# Patient Record
Sex: MALE
[De-identification: names, ages, dates, MRNs, and addresses within clinical notes are randomized per-mention and may not be internally consistent; named-entity substitution may affect disease eponyms.]

---

## 2017-12-13 ENCOUNTER — APPOINTMENT (OUTPATIENT)
Dept: DERMATOLOGY | Facility: CLINIC | Age: 82
End: 2017-12-13

## 2018-04-02 ENCOUNTER — APPOINTMENT (OUTPATIENT)
Dept: DERMATOLOGY | Facility: CLINIC | Age: 83
End: 2018-04-02
Payer: MEDICARE

## 2018-04-02 PROCEDURE — 99213 OFFICE O/P EST LOW 20 MIN: CPT

## 2021-01-04 ENCOUNTER — APPOINTMENT (OUTPATIENT)
Dept: DERMATOLOGY | Facility: CLINIC | Age: 86
End: 2021-01-04
Payer: MEDICARE

## 2021-01-04 PROCEDURE — 10060 I&D ABSCESS SIMPLE/SINGLE: CPT

## 2021-01-09 ENCOUNTER — EMERGENCY (EMERGENCY)
Facility: HOSPITAL | Age: 86
LOS: 1 days | Discharge: HOME | End: 2021-01-09
Attending: EMERGENCY MEDICINE | Admitting: STUDENT IN AN ORGANIZED HEALTH CARE EDUCATION/TRAINING PROGRAM
Payer: MEDICARE

## 2021-01-09 VITALS
TEMPERATURE: 98 F | SYSTOLIC BLOOD PRESSURE: 125 MMHG | OXYGEN SATURATION: 99 % | HEIGHT: 65 IN | WEIGHT: 164.91 LBS | HEART RATE: 82 BPM | DIASTOLIC BLOOD PRESSURE: 80 MMHG | RESPIRATION RATE: 16 BRPM

## 2021-01-09 LAB
ALBUMIN SERPL ELPH-MCNC: 3.1 G/DL — LOW (ref 3.3–5.2)
ALP SERPL-CCNC: 100 U/L — SIGNIFICANT CHANGE UP (ref 40–120)
ALT FLD-CCNC: 26 U/L — SIGNIFICANT CHANGE UP
ANION GAP SERPL CALC-SCNC: 9 MMOL/L — SIGNIFICANT CHANGE UP (ref 5–17)
APPEARANCE UR: CLEAR — SIGNIFICANT CHANGE UP
AST SERPL-CCNC: 32 U/L — SIGNIFICANT CHANGE UP
BASOPHILS # BLD AUTO: 0 K/UL — SIGNIFICANT CHANGE UP (ref 0–0.2)
BASOPHILS NFR BLD AUTO: 0 % — SIGNIFICANT CHANGE UP (ref 0–2)
BILIRUB SERPL-MCNC: 1.2 MG/DL — SIGNIFICANT CHANGE UP (ref 0.4–2)
BILIRUB UR-MCNC: NEGATIVE — SIGNIFICANT CHANGE UP
BUN SERPL-MCNC: 36 MG/DL — HIGH (ref 8–20)
CALCIUM SERPL-MCNC: 8.5 MG/DL — LOW (ref 8.6–10.2)
CHLORIDE SERPL-SCNC: 99 MMOL/L — SIGNIFICANT CHANGE UP (ref 98–107)
CO2 SERPL-SCNC: 28 MMOL/L — SIGNIFICANT CHANGE UP (ref 22–29)
COLOR SPEC: YELLOW — SIGNIFICANT CHANGE UP
CREAT SERPL-MCNC: 1.76 MG/DL — HIGH (ref 0.5–1.3)
DIFF PNL FLD: NEGATIVE — SIGNIFICANT CHANGE UP
EOSINOPHIL # BLD AUTO: 0.17 K/UL — SIGNIFICANT CHANGE UP (ref 0–0.5)
EOSINOPHIL NFR BLD AUTO: 1.7 % — SIGNIFICANT CHANGE UP (ref 0–6)
EPI CELLS # UR: SIGNIFICANT CHANGE UP
GLUCOSE SERPL-MCNC: 110 MG/DL — HIGH (ref 70–99)
GLUCOSE UR QL: NEGATIVE MG/DL — SIGNIFICANT CHANGE UP
HCT VFR BLD CALC: 27 % — LOW (ref 39–50)
HGB BLD-MCNC: 8.3 G/DL — LOW (ref 13–17)
KETONES UR-MCNC: NEGATIVE — SIGNIFICANT CHANGE UP
LEUKOCYTE ESTERASE UR-ACNC: ABNORMAL
LYMPHOCYTES # BLD AUTO: 0.42 K/UL — LOW (ref 1–3.3)
LYMPHOCYTES # BLD AUTO: 4.3 % — LOW (ref 13–44)
MCHC RBC-ENTMCNC: 22.6 PG — LOW (ref 27–34)
MCHC RBC-ENTMCNC: 30.7 GM/DL — LOW (ref 32–36)
MCV RBC AUTO: 73.6 FL — LOW (ref 80–100)
MONOCYTES # BLD AUTO: 0.17 K/UL — SIGNIFICANT CHANGE UP (ref 0–0.9)
MONOCYTES NFR BLD AUTO: 1.7 % — LOW (ref 2–14)
NEUTROPHILS # BLD AUTO: 9.09 K/UL — HIGH (ref 1.8–7.4)
NEUTROPHILS NFR BLD AUTO: 92.3 % — HIGH (ref 43–77)
NITRITE UR-MCNC: NEGATIVE — SIGNIFICANT CHANGE UP
PH UR: 6 — SIGNIFICANT CHANGE UP (ref 5–8)
PLATELET # BLD AUTO: 203 K/UL — SIGNIFICANT CHANGE UP (ref 150–400)
POTASSIUM SERPL-MCNC: 4.4 MMOL/L — SIGNIFICANT CHANGE UP (ref 3.5–5.3)
POTASSIUM SERPL-SCNC: 4.4 MMOL/L — SIGNIFICANT CHANGE UP (ref 3.5–5.3)
PROT SERPL-MCNC: 7.1 G/DL — SIGNIFICANT CHANGE UP (ref 6.6–8.7)
PROT UR-MCNC: 15 MG/DL
RBC # BLD: 3.67 M/UL — LOW (ref 4.2–5.8)
RBC # FLD: 19.2 % — HIGH (ref 10.3–14.5)
RBC CASTS # UR COMP ASSIST: NEGATIVE /HPF — SIGNIFICANT CHANGE UP (ref 0–4)
SODIUM SERPL-SCNC: 135 MMOL/L — SIGNIFICANT CHANGE UP (ref 135–145)
SP GR SPEC: 1.01 — SIGNIFICANT CHANGE UP (ref 1.01–1.02)
UROBILINOGEN FLD QL: 1 MG/DL
WBC # BLD: 9.85 K/UL — SIGNIFICANT CHANGE UP (ref 3.8–10.5)
WBC # FLD AUTO: 9.85 K/UL — SIGNIFICANT CHANGE UP (ref 3.8–10.5)
WBC UR QL: SIGNIFICANT CHANGE UP

## 2021-01-09 PROCEDURE — 99284 EMERGENCY DEPT VISIT MOD MDM: CPT

## 2021-01-09 PROCEDURE — 99218: CPT

## 2021-01-09 PROCEDURE — 72131 CT LUMBAR SPINE W/O DYE: CPT | Mod: 26

## 2021-01-09 RX ORDER — APIXABAN 2.5 MG/1
2.5 TABLET, FILM COATED ORAL EVERY 12 HOURS
Refills: 0 | Status: DISCONTINUED | OUTPATIENT
Start: 2021-01-09 | End: 2021-01-13

## 2021-01-09 RX ORDER — FINASTERIDE 5 MG/1
5 TABLET, FILM COATED ORAL DAILY
Refills: 0 | Status: DISCONTINUED | OUTPATIENT
Start: 2021-01-09 | End: 2021-01-13

## 2021-01-09 RX ORDER — ACETAMINOPHEN 500 MG
975 TABLET ORAL ONCE
Refills: 0 | Status: COMPLETED | OUTPATIENT
Start: 2021-01-09 | End: 2021-01-09

## 2021-01-09 RX ORDER — LIDOCAINE 4 G/100G
1 CREAM TOPICAL ONCE
Refills: 0 | Status: COMPLETED | OUTPATIENT
Start: 2021-01-09 | End: 2021-01-10

## 2021-01-09 RX ORDER — OXYCODONE AND ACETAMINOPHEN 5; 325 MG/1; MG/1
1 TABLET ORAL EVERY 8 HOURS
Refills: 0 | Status: DISCONTINUED | OUTPATIENT
Start: 2021-01-09 | End: 2021-01-10

## 2021-01-09 RX ORDER — CYCLOBENZAPRINE HYDROCHLORIDE 10 MG/1
5 TABLET, FILM COATED ORAL ONCE
Refills: 0 | Status: COMPLETED | OUTPATIENT
Start: 2021-01-09 | End: 2021-01-09

## 2021-01-09 RX ORDER — LIDOCAINE 4 G/100G
1 CREAM TOPICAL ONCE
Refills: 0 | Status: COMPLETED | OUTPATIENT
Start: 2021-01-09 | End: 2021-01-09

## 2021-01-09 RX ORDER — ASPIRIN/CALCIUM CARB/MAGNESIUM 324 MG
81 TABLET ORAL DAILY
Refills: 0 | Status: DISCONTINUED | OUTPATIENT
Start: 2021-01-09 | End: 2021-01-13

## 2021-01-09 RX ORDER — AMIODARONE HYDROCHLORIDE 400 MG/1
200 TABLET ORAL DAILY
Refills: 0 | Status: DISCONTINUED | OUTPATIENT
Start: 2021-01-09 | End: 2021-01-13

## 2021-01-09 RX ORDER — TAMSULOSIN HYDROCHLORIDE 0.4 MG/1
0.8 CAPSULE ORAL AT BEDTIME
Refills: 0 | Status: DISCONTINUED | OUTPATIENT
Start: 2021-01-09 | End: 2021-01-13

## 2021-01-09 RX ADMIN — APIXABAN 2.5 MILLIGRAM(S): 2.5 TABLET, FILM COATED ORAL at 23:13

## 2021-01-09 RX ADMIN — Medication 81 MILLIGRAM(S): at 23:13

## 2021-01-09 RX ADMIN — OXYCODONE AND ACETAMINOPHEN 1 TABLET(S): 5; 325 TABLET ORAL at 23:14

## 2021-01-09 RX ADMIN — AMIODARONE HYDROCHLORIDE 200 MILLIGRAM(S): 400 TABLET ORAL at 23:13

## 2021-01-09 RX ADMIN — FINASTERIDE 5 MILLIGRAM(S): 5 TABLET, FILM COATED ORAL at 23:14

## 2021-01-09 RX ADMIN — LIDOCAINE 1 PATCH: 4 CREAM TOPICAL at 12:46

## 2021-01-09 RX ADMIN — LIDOCAINE 1 PATCH: 4 CREAM TOPICAL at 20:02

## 2021-01-09 RX ADMIN — CYCLOBENZAPRINE HYDROCHLORIDE 5 MILLIGRAM(S): 10 TABLET, FILM COATED ORAL at 12:46

## 2021-01-09 RX ADMIN — Medication 975 MILLIGRAM(S): at 17:03

## 2021-01-09 RX ADMIN — TAMSULOSIN HYDROCHLORIDE 0.8 MILLIGRAM(S): 0.4 CAPSULE ORAL at 23:13

## 2021-01-09 NOTE — ED PROVIDER NOTE - CLINICAL SUMMARY MEDICAL DECISION MAKING FREE TEXT BOX
CT with age inderminate compression fracture at L1 but pain is lower than that.  Pt given meds but still having significant pain and unable to get up or walk. will put in obs for mri and PT consult. case d/w Dr. Rehman.,

## 2021-01-09 NOTE — ED CDU PROVIDER INITIAL DAY NOTE - MEDICAL DECISION MAKING DETAILS
fall chronic lower back pain no new fracture . pain med as per primary team plan MRI of lumbar   abscess on the upper right shoulder irrigated with NS 20 cc with pluratant d.c  started the pt on bactrim   home med   basic labs   cardiac mon   PT and Sw and C/M

## 2021-01-09 NOTE — ED CDU PROVIDER INITIAL DAY NOTE - OBJECTIVE STATEMENT
Pt is a 89 yo Male pMh of HTn, HLD , CAD S.p AS S.p TVAR , anemia, pul/htn ,  CHF  , afib on eliquis, BIBA in ER and co low back pain.  Pt states that last night around 2 AM he got up to used the bathroom and when he went to sit down he missed the toilet and landed on his butt.  Pt states that his wife helped up and to bed but since then he has had significant low back pain that is worse with movement. Pt states that he took tylenol this morning without relief. Pt is absolutely certain that he did not hit his head and has no other injuries from the fall. he denies any head trauma or LOC  pt has right side of the shoulder abscess that 5days ago been lanced not on antibiotic and draining with foul smell  pt states he has LE edema as usual no new worsening of LE edema

## 2021-01-09 NOTE — ED ADULT TRIAGE NOTE - CHIEF COMPLAINT QUOTE
pt comes to ed from home for eval s/p fall at 2 am. patient went to the bathroom and went to sit but missed the toilet falling on the floor. states his wife assisted him up. He has been having increasing pain to lower back. Denies pain or numbness to lower extremities. did not hit head or lose consciousness. patient is on eliquis and aspirin

## 2021-01-09 NOTE — ED CDU PROVIDER INITIAL DAY NOTE - MUSCULOSKELETAL, MLM
Spine appears normal, mild line spine around L1L2 TTP , no rash deformities , able to walk , LE strength grossly intact Spine appears normal, mild line spine around L4/L5 and L5 S1 TTP , no rash deformities , able to walk , LE strength grossly intact Spine appears normal, mild line spine around L4/L5 and L5 S1 TTP , no rash deformities , able to stand , LE strength grossly intact

## 2021-01-09 NOTE — ED ADULT NURSE NOTE - OBJECTIVE STATEMENT
Pt presents with c/o of Low back pain. Pt states he went to use the bathroom early this morning and missed the toilet trying to sit and fell on his buttocks. Pt states wife was able to help him up and back to bed. Pt remained in severe pain, unrelieved by tylenol, and called his son who then called EMS. No deformities noted.

## 2021-01-09 NOTE — ED CDU PROVIDER INITIAL DAY NOTE - ATTENDING CONTRIBUTION TO CARE
I agree with the PA's note and was available for any issues/concerns. I was directly involved in patient care. My brief overall assessment is as follows:    89 year old male PMHx HTN, CHF c/o back pain s/p mechanical fall today, + right posterior shoulder abscess. Initial work up noted; Patient admitted to observation for pain control, MRI, PT evaluation and wound reassessment

## 2021-01-09 NOTE — ED CDU PROVIDER INITIAL DAY NOTE - PMH
CAD (coronary artery disease)    Congestive heart failure, unspecified HF chronicity, unspecified heart failure type    Hyperlipidemia, unspecified hyperlipidemia type    Hypertension, unspecified type    Mitral valve replaced    Nonrheumatic aortic valve stenosis  S.p TVAR  PE (pulmonary thromboembolism)

## 2021-01-09 NOTE — ED ADULT NURSE REASSESSMENT NOTE - NSIMPLEMENTINTERV_GEN_ALL_ED
Implemented All Fall with Harm Risk Interventions:  [place] to call system. Call bell, personal items and telephone within reach. Instruct patient to call for assistance. Room bathroom lighting operational. Non-slip footwear when patient is off stretcher. Physically safe environment: no spills, clutter or unnecessary equipment. Stretcher in lowest position, wheels locked, appropriate side rails in place. Provide visual cue, wrist band, yellow gown, etc. Monitor gait and stability. Monitor for mental status changes and reorient to person, place, and time. Review medications for side effects contributing to fall risk. Reinforce activity limits and safety measures with patient and family. Provide visual clues: red socks.

## 2021-01-09 NOTE — ED PROVIDER NOTE - PHYSICAL EXAMINATION
Constitutional - well-developed; well nourished. Head - NCAT. Airway patent. Eyes - PERRL. CV - RRR. no murmur. no edema. Pulm - CTAB. Abd - soft, nt. no rebound. no guarding. Neuro - A&Ox3. strength 5/5 x4. sensation intact x4. normal gait. Skin - No rash. MSK - normal ROM. mild lumbar ttp

## 2021-01-09 NOTE — ED ADULT NURSE NOTE - CAS ELECT INFOMATION PROVIDED
Pt A&O x4, VSS afebrile. Pt discharged to home. SL removed. Discharge instructions reviewed with patient. Questions answered. Pt with no further concerns or questions./DC instructions

## 2021-01-10 VITALS
OXYGEN SATURATION: 94 % | DIASTOLIC BLOOD PRESSURE: 54 MMHG | SYSTOLIC BLOOD PRESSURE: 94 MMHG | RESPIRATION RATE: 18 BRPM | TEMPERATURE: 98 F | HEART RATE: 60 BPM

## 2021-01-10 LAB
CULTURE RESULTS: SIGNIFICANT CHANGE UP
SARS-COV-2 RNA SPEC QL NAA+PROBE: SIGNIFICANT CHANGE UP
SPECIMEN SOURCE: SIGNIFICANT CHANGE UP

## 2021-01-10 PROCEDURE — 36415 COLL VENOUS BLD VENIPUNCTURE: CPT

## 2021-01-10 PROCEDURE — 99284 EMERGENCY DEPT VISIT MOD MDM: CPT

## 2021-01-10 PROCEDURE — U0005: CPT

## 2021-01-10 PROCEDURE — 71045 X-RAY EXAM CHEST 1 VIEW: CPT | Mod: 26

## 2021-01-10 PROCEDURE — 72131 CT LUMBAR SPINE W/O DYE: CPT

## 2021-01-10 PROCEDURE — G0378: CPT

## 2021-01-10 PROCEDURE — 71045 X-RAY EXAM CHEST 1 VIEW: CPT

## 2021-01-10 PROCEDURE — 72148 MRI LUMBAR SPINE W/O DYE: CPT | Mod: 26

## 2021-01-10 PROCEDURE — 87086 URINE CULTURE/COLONY COUNT: CPT

## 2021-01-10 PROCEDURE — 99217: CPT

## 2021-01-10 PROCEDURE — 85025 COMPLETE CBC W/AUTO DIFF WBC: CPT

## 2021-01-10 PROCEDURE — 81001 URINALYSIS AUTO W/SCOPE: CPT

## 2021-01-10 PROCEDURE — 80053 COMPREHEN METABOLIC PANEL: CPT

## 2021-01-10 PROCEDURE — 72148 MRI LUMBAR SPINE W/O DYE: CPT

## 2021-01-10 PROCEDURE — U0003: CPT

## 2021-01-10 RX ADMIN — APIXABAN 2.5 MILLIGRAM(S): 2.5 TABLET, FILM COATED ORAL at 10:28

## 2021-01-10 RX ADMIN — OXYCODONE AND ACETAMINOPHEN 1 TABLET(S): 5; 325 TABLET ORAL at 10:28

## 2021-01-10 RX ADMIN — LIDOCAINE 1 PATCH: 4 CREAM TOPICAL at 05:17

## 2021-01-10 RX ADMIN — Medication 81 MILLIGRAM(S): at 10:28

## 2021-01-10 RX ADMIN — LIDOCAINE 1 PATCH: 4 CREAM TOPICAL at 05:21

## 2021-01-10 RX ADMIN — Medication 1 TABLET(S): at 05:59

## 2021-01-10 RX ADMIN — Medication 20 MILLIGRAM(S): at 14:14

## 2021-01-10 RX ADMIN — Medication 20 MILLIGRAM(S): at 04:52

## 2021-01-10 NOTE — ED CDU PROVIDER DISPOSITION NOTE - CARE PROVIDER_API CALL
Tereso Amaya; PhD)  Neurosurgery  [address] [place] [address]  Phone: [phone]  Fax: [phone]  Follow Up Time:

## 2021-01-10 NOTE — ED CDU PROVIDER SUBSEQUENT DAY NOTE - CARDIAC, MLM
Normal rate, regular rhythm.  Heart sounds S1, S2.  No murmurs, rubs or gallops. Normal rate, regular rhythm.  Heart sounds S1, S2., LE +1/2 pitting edema no calf TTP

## 2021-01-10 NOTE — PHYSICAL THERAPY INITIAL EVALUATION ADULT - STANDING BALANCE: DYNAMIC, REHAB EVAL
[[email or web address]] : Four days ago had breakfast sausage for dinner, that night had burning in chest, mid sternal, relieved by peptobismal. Last night also had it, took 3 more tums and relieved by midnight. Saw Dr. Avelino Beach yesterday at 10:30. Takes protonix 40 mg with food in the morning, advised to take 30 minutes before eating in the morning. +anxious that it could be her heart. Has changed her diet to eat healthier recently, lost 5 pounds. \par \par ROS: negative except as noted above\par 
with RW/good balance

## 2021-01-10 NOTE — CONSULT NOTE ADULT - SUBJECTIVE AND OBJECTIVE BOX
Neurosurgery PA-C  Consult note    Patient is a 89 year old right hand dominant male with complaints of lower back pain. Pt states that last night around 2 AM he got up to used the bathroom and when he went to sit down he missed the toilet and landed on his butt.  Pt states that his wife helped up and to bed but since then he has had significant low back pain that is worse with movement. Pt states that he took Tylenol this morning without relief. Pt is absolutely certain that he did not hit his head and has no other injuries from the fall. MRI of the Thoracic spine revealed a T12 vertebral body acute fracture and a chronic L1 vertebral body fracture.     INTERVAL HPI OVERNIGHT EVENTS:  89 year old right male s/post fall early this morning with an acute T12 vertebral body fracture and a chronic L1 fracture, seen lying comfortably in bed. Patient denies headache, weakness, numbness, n/v/d, fevers, chills, chest pain, SOB. Patient admits to lower back pain with movement.     Vital Signs Last 24 Hrs  T(C): 36.6 (10 Jacky 2021 10:25), Max: 36.8 (2021 11:46)  T(F): 97.8 (10 Jacky 2021 10:25), Max: 98.2 (2021 11:46)  HR: 70 (10 Jacky 2021 10:25) (57 - 82)  BP: 104/63 (10 Jacky 2021 10:25) (104/63 - 129/67)  RR: 16 (10 Jacky 2021 10:25) (16 - 18)  SpO2: 96% (10 Jacky 2021 10:25) (95% - 99%)    PHYSICAL EXAM:  GENERAL: NAD, well-groomed, well-developed  HEAD:  Atraumatic, normocephalic  MARKEL COMA SCORE: E- V- M- = 15  MENTAL STATUS: AAO x3, Appropriately conversant, following simple commands   CRANIAL NERVES: PERRL. EOMI without nystagmus. Facial sensation intact V1-3 distribution b/l. Face symmetric w/ normal eye closure and smile, tongue midline. Hearing grossly intact. Speech clear. Head turning and shoulder shrug intact.   REFLEXES: No pronator drift negative clonus b/l  MOTOR: strength 5/5 b/l upper and lower extremities  Lowers      HF     KF      KE     PF     DF      R             5/5     5/5     5/5    5/5   5/5    L              5/5    5/5      5/5    5/5   5/5      SENSATION: grossly intact to light touch all extremities  CHEST/LUNG: Clear to auscultation bilaterally  HEART: +S1/+S2  ABDOMEN: Soft, nontender, nondistended  EXTREMITIES:  2+ peripheral pulses, no clubbing, cyanosis, or edema  SKIN: Warm, dry; no rashes or lesions    LABS:                        8.3    9.85  )-----------( 203      ( 2021 21:22 )             27.0         135  |  99  |  36.0<H>  ----------------------------<  110<H>  4.4   |  28.0  |  1.76<H>    Ca    8.5<L>      2021 21:22    TPro  7.1  /  Alb  3.1<L>  /  TBili  1.2  /  DBili  x   /  AST  32  /  ALT  26  /  AlkPhos  100      Urinalysis Basic - ( 2021 21:23 )    Color: Yellow / Appearance: Clear / S.015 / pH: x  Gluc: x / Ketone: Negative  / Bili: Negative / Urobili: 1 mg/dL   Blood: x / Protein: 15 mg/dL / Nitrite: Negative   Leuk Esterase: Trace / RBC: Negative /HPF / WBC 0-2   Sq Epi: x / Non Sq Epi: Occasional / Bacteria: x    RADIOLOGY & ADDITIONAL TESTS:  EXAM:  MR SPINE LUMBAR                        PROCEDURE DATE:  01/10/2021    INTERPRETATION:  CLINICAL INFORMATION: Back trauma, abnormal neuro exam, CT or xray positive. . .    TECHNIQUE: Multiplanar multisequence MR of the lumbar spine was performed without intravenous contrast.    COMPARISON: CT lumbar spine 2021    FINDINGS:    Bone marrow edema in the T12 vertebral body with mild loss of height of the superior endplate suggesting acute fracture. No significant bony retropulsion. Chronic fracture of the L1 vertebral body with mild bony retropulsion, without significant spinal canal narrowing. Remaining lumbar vertebral body heights and signal are within normal limits. Lumbar dextroscoliosis.    Evaluation of individual levels demonstrates:    L1-L2: No significant spinal canal or neuroforaminal narrowing.    L2-L3: Disc bulge. Bilateral facet arthrosis and ligamentum flavum hypertrophy. Mild spinal canal narrowing. Mild bilateral neuroforaminal narrowing.    L3-L4: Discbulge. Bilateral facet arthrosis and ligamentum flavum hypertrophy. Severe spinal canal narrowing. Severe bilateral neuroforaminal narrowing.    L4-L5: Disc bulge. Bilateral facet arthrosis and ligamentum flavum hypertrophy. Mild spinal canal narrowing. Mild left and severe right neuroforaminal narrowing.    L5-S1: No significant spinal canal or neuroforaminal narrowing.    The conus is normal in position and morphology at the T12-L1 level.    Presacral edema. T2 hyperintense lesions in the kidneys suggesting cysts.      IMPRESSION:  1.  Bone marrow edema in the T12 vertebral body with mild loss of height of the superior endplate suggesting acute fracture. No significant bony retropulsion. Chronic fracture of the L1 vertebral body with mild bony retropulsion, without significant spinal canal narrowing.  2.  Presacral edema, nonspecific. If clinically warranted, CT abdomen and pelvis with contrast can be obtained.          EXAM:  CT LUMBAR SPINE                        PROCEDURE DATE:  2021    INTERPRETATION:  CLINICAL INFORMATION: Back trauma, no prior imaging. . .    TECHNIQUE: Noncontrast CT scan of the lumbar spine was performed. Thin section axial images were obtained with sagittal and coronal reformations.    COMPARISON: None.    FINDINGS:    Lumbar dextroscoliosis. Age indeterminate fracture of the L1 vertebral body, with mild bony retropulsion. Mild loss of height of the superior T12 endplate. Remaining lumbar vertebral body heights are within normal limits. Multilevel intervertebral disc height loss with vacuum phenomenon. Multilevel disc bulges and facet arthrosis.    Right pleural effusion. Right renal cyst. Presacral edema.    Partially imaged right hip hardware.      IMPRESSION:  1.  Age indeterminate fracture of the L1 vertebral body, with mild bony retropulsion. Mild loss of height of the superior T12 endplate.  2.  Presacral edema.

## 2021-01-10 NOTE — ED CDU PROVIDER SUBSEQUENT DAY NOTE - ATTENDING CONTRIBUTION TO CARE
Ella: I performed a face to face bedside interview with patient regarding history of present illness, review of symptoms and past medical history. I completed an independent physical exam.  I have discussed patient's plan of care with advanced care provider.   I agree with note as stated above including HISTORY OF PRESENT ILLNESS, HIV, PAST MEDICAL/SURGICAL/FAMILY/SOCIAL HISTORY, ALLERGIES AND HOME MEDICATIONS, REVIEW OF SYSTEMS, PHYSICAL EXAM, MEDICAL DECISION MAKING and any PROGRESS NOTES during the time I functioned as the attending physician for this patient  unless otherwise noted. My brief assessment is as follows: Patient with L1fx s/p fall, placed in TLSO brace, seen by PT, will go home with a walker. Will follow up with NSG. Return precautions given.

## 2021-01-10 NOTE — CONSULT NOTE ADULT - ASSESSMENT
This is a 89 year old male with an acute T12 vertebral body fracture and a L1 vertebral body chronic fracture.     1. TLSO ordered with orthotics  2. Maintain bedrest until TLSO brace arrives  3. Tylenol for pain, to avoid over sedation   4. Neuro checks every 4 hours   5. Once patient has his TLSO brace, please instruct patient to follow up with Dr Amaya   6. Plan was discussed with Dr Amaya

## 2021-01-10 NOTE — ED CDU PROVIDER SUBSEQUENT DAY NOTE - MEDICAL DECISION MAKING DETAILS
fall chronic lower back pain no new fracture . pain med as per primary team plan MRI of lumbar   abscess on the upper right shoulder irrigated with NS 20 cc with pluratant d.c  started the pt on bactrim   home med   basic labs   cardiac mon   PT and Sw and C/M  added CXR given hx of pul htn , CHF . pt asymptomatic fall chronic lower back pain no new fracture . pain med as per primary team plan MRI of lumbar   abscess on the upper right shoulder irrigated with NS 20 cc with pluratant d.c  started the pt on bactrim   home med   basic labs   cardiac mon   PT and Sw and C/M  added CXR given hx of pul htn , CHF . pt asymptomatic. pt is taking torsemide

## 2021-01-10 NOTE — ED CDU PROVIDER SUBSEQUENT DAY NOTE - ENMT, MLM
Airway patent. Nasal mucosa clear. Mouth with normal mucosa. Throat has no vesicles, no oropharyngeal exudates and uvula is midline. Airway patent. NCAT

## 2021-01-10 NOTE — CHART NOTE - NSCHARTNOTEFT_GEN_A_CORE
Joey was measured and fit with a TLSO brace today. The brace was set up to his anatomy. It does not need to be worn while in bed. It should be worn while both standing and sitting in an upright position. To apply, place his arms through the upper straps in the same way you would place your arms through the sleeves of a jacket. Wrap the main body of the brace around the torso below the chest. Tighten the torso straps before securing the shoulder straps. Tightening the shoulder straps first will only cause the brace to ride up. Always loosen the shoulder straps before both donning and doffing the brace. Use and care were explained and instructions along with contact info were given as well.    FARIHA Sanchez  [facility]  [phone]
PA made SW aware patient is medically stable for discharge. PT is recommending for the patient to return home upon discharge. PA made SW aware patient may need assistance with transportation to return to residence. SW met with patient at bedside. Patient reported his son, Nathan Funes, will take the patient back to his residence. Patient reported his daughter, Manju Funes, is aware of patient's discharge plan. Patient reported his wife, Perla Funes ([phone]), will be home to accept him to the residence. SW placed call to patient's wife and patient's wife confirmed discharge plan. No further SW needs indicated.
As per IRENE Everett, patient is to be fitted for TLSO brace prior to physical therapy meeting with patient. PT reported they will meet with patient to complete assessment after fitting is complete. SW will continue to follow for PT recommendations and to facilitate safe and appropriate discharge.

## 2021-01-10 NOTE — PROVIDER CONTACT NOTE (OTHER) - SITUATION
Chart reviewed, contents noted. MD order for PT eval received. Pt. MRI L spine reveals T12 acute fx. PT will hold pending neurosx consult and recommendations. D/W Marguerite BONILLA and in agreement.
chart reviewed, contents noted, PT evaluation completed and documented, see note for details. Pt with 6-7/10 pain pre, 8/10 pain post evaluation at posterior spine.

## 2021-01-10 NOTE — ED CDU PROVIDER DISPOSITION NOTE - CLINICAL COURSE
This is a 89 yo Male pMh of HTn, HLD , CAD S.p AS S.p TVAR , anemia, pul/htn ,CHF, afib on eliquis, BIBA in ER and co low back pain.  Pt states that last night around 2 AM he got up to used the bathroom and when he went to sit down he missed the toilet and landed on his butt.  Pt states that his wife helped up and to bed but since then he has had significant low back pain that is worse with movement. Pt states that he took tylenol this morning without relief. Pt is absolutely certain that he did not hit his head and has no other injuries from the fall. he denies any head trauma or LOC.  Patient found on CT to have age indeterminent L1 FX, placed in observation for MRI.  Confirmed T12 acute FX.  Neurosurgery consulted.  Placed in TLSO brace, seen by PT cleared, stable to go home has walker at home.  Given copies of all results.  Understands and agrees to proceed to follow up with neurosurgery as outpatient.

## 2021-01-10 NOTE — PHYSICAL THERAPY INITIAL EVALUATION ADULT - PERTINENT HX OF CURRENT PROBLEM, REHAB EVAL
s/p fall at home. Pt is absolutely certain that he did not hit his head and has no other injuries from the fall. MRI of the Thoracic spine revealed a T12 vertebral body acute fracture and a chronic L1 vertebral body fracture.

## 2021-01-10 NOTE — ED ADULT NURSE REASSESSMENT NOTE - NS ED NURSE REASSESS COMMENT FT1
As per IRENE Lee orders give torsemide now
Pt O2 sat 88-90 Pt no difficulty breathing VSS. IRENE Lee made aware Pt to be put on 2L NC
pt status unchanged, refer to flowsheet and chart, pt safety maintained, pt hemodynamically stable. Pt POC explained and verbalized understanding. Pt moved to OBS.
Assumed care of the Pt at 2130. Verbal report received from DORITA Neff. Pt is AOx4 in no acute distress. Denies any dizziness, chest pain and SOB. Pt complaining of Bilateral lower back pain meds to be given as per orders. Pt VSS. Pt breathing even and unlabored. Pt IV patent. Pt pending PT and SW consult in the AM plan of care explained wait time explained, Pt in understanding of plan of care will continue to monitor.
Assumed care of the patient @5976. Pt A&Ox4. Patient in understanding of plan of care. Patient with no further questions for the RN. Resting in comfort. Call bell within reach and encouraged to use when assistance needed. Will continue to monitor.

## 2021-01-10 NOTE — PROVIDER CONTACT NOTE (OTHER) - ASSESSMENT
Pt is modified independent with functional mobility with use of RW. Pt able to ambulate 150ft and negotiate 8 steps with no loss of balance noted. Mild fatigue and mild increased in pain with mobility.

## 2021-01-10 NOTE — PROVIDER CONTACT NOTE (OTHER) - RECOMMENDATIONS
D/C Recommendations: Home with use of RW. Pt informed on D/C recommendations and DME recommendation- pt verbalizing understanding. Reviewed donning/doffing TLSO.

## 2021-01-10 NOTE — PHYSICAL THERAPY INITIAL EVALUATION ADULT - GENERAL OBSERVATIONS, REHAB EVAL
Pt received in seated at edge of stretcher, + IV Loc, +TLSO brace + tele, breathing on RA in NAD, in 6-7/10 pain, agreeable to PT evaluation

## 2021-01-10 NOTE — ED CDU PROVIDER DISPOSITION NOTE - NSFOLLOWUPINSTRUCTIONS_ED_ALL_ED_FT
Please follow up with neurosurgery as outpatient    Take tylenol as needed for pain    Return if symptoms worsen or persist.

## 2021-01-11 ENCOUNTER — APPOINTMENT (OUTPATIENT)
Dept: DERMATOLOGY | Facility: CLINIC | Age: 86
End: 2021-01-11

## 2021-01-11 PROBLEM — I25.10 ATHEROSCLEROTIC HEART DISEASE OF NATIVE CORONARY ARTERY WITHOUT ANGINA PECTORIS: Chronic | Status: ACTIVE | Noted: 2021-01-09

## 2021-01-11 PROBLEM — E78.5 HYPERLIPIDEMIA, UNSPECIFIED: Chronic | Status: ACTIVE | Noted: 2021-01-09

## 2021-01-11 PROBLEM — I26.99 OTHER PULMONARY EMBOLISM WITHOUT ACUTE COR PULMONALE: Chronic | Status: ACTIVE | Noted: 2021-01-09

## 2021-01-11 PROBLEM — Z95.2 PRESENCE OF PROSTHETIC HEART VALVE: Chronic | Status: ACTIVE | Noted: 2021-01-09

## 2021-01-11 PROBLEM — I35.0 NONRHEUMATIC AORTIC (VALVE) STENOSIS: Chronic | Status: ACTIVE | Noted: 2021-01-09

## 2021-01-11 PROBLEM — I10 ESSENTIAL (PRIMARY) HYPERTENSION: Chronic | Status: ACTIVE | Noted: 2021-01-09

## 2021-01-11 PROBLEM — I50.9 HEART FAILURE, UNSPECIFIED: Chronic | Status: ACTIVE | Noted: 2021-01-09

## 2021-01-28 ENCOUNTER — APPOINTMENT (OUTPATIENT)
Dept: DERMATOLOGY | Facility: CLINIC | Age: 86
End: 2021-01-28
Payer: MEDICARE

## 2021-01-28 PROCEDURE — 11900 INJECT SKIN LESIONS </W 7: CPT

## 2021-02-03 ENCOUNTER — APPOINTMENT (OUTPATIENT)
Dept: DERMATOLOGY | Facility: CLINIC | Age: 86
End: 2021-02-03
Payer: MEDICARE

## 2021-02-03 PROCEDURE — 11900 INJECT SKIN LESIONS </W 7: CPT

## 2021-02-03 PROCEDURE — 99213 OFFICE O/P EST LOW 20 MIN: CPT | Mod: 25

## 2021-05-28 ENCOUNTER — APPOINTMENT (OUTPATIENT)
Dept: ULTRASOUND IMAGING | Facility: CLINIC | Age: 86
End: 2021-05-28
Payer: MEDICARE

## 2021-05-28 ENCOUNTER — OUTPATIENT (OUTPATIENT)
Dept: OUTPATIENT SERVICES | Facility: HOSPITAL | Age: 86
LOS: 1 days | End: 2021-05-28

## 2021-05-28 DIAGNOSIS — Z00.00 ENCOUNTER FOR GENERAL ADULT MEDICAL EXAMINATION WITHOUT ABNORMAL FINDINGS: ICD-10-CM

## 2021-05-28 PROCEDURE — 76775 US EXAM ABDO BACK WALL LIM: CPT | Mod: 26

## 2021-06-22 ENCOUNTER — APPOINTMENT (OUTPATIENT)
Dept: NEPHROLOGY | Facility: CLINIC | Age: 86
End: 2021-06-22

## 2023-04-13 NOTE — PHYSICAL THERAPY INITIAL EVALUATION ADULT - ADDITIONAL COMMENTS
Problem: At Risk for Falls  Goal: # Patient does not fall  Outcome: Outcome Met, Continue evaluating goal progress toward completion     Problem: VTE, Risk for  Goal: # No s/s of VTE  Outcome: Outcome Met, Continue evaluating goal progress toward completion     Problem: Fluid Volume Excess, Risk for  Goal: # Absence of Rapid Weight Gain (no more than 2kg in 24 hours)  Description: FVE Risk Patients may gain weight (but not more than 2 kg) but may not require aggressive treatment if in the absence of dyspnea; FVE (actual) patients should be monitored to achieve no weight gain.   Outcome: Outcome Met, Continue evaluating goal progress toward completion      Pt reports living in a private home with his wife with 8 steps to enter (the first four steps no handrails but the steps are wide enough to place RW on step, and the second four steps with bilateral handrails steps remaining wide enough to place RW onto step). Pt owns RW, cane, wheelchair, commode and shower chair. Independent prior to admission with use of no AD. Pt denies any recent falls prior to this one. Pt drives and is retired. Pt reports living in a private home with his wife with 8 steps to enter (the first four steps no handrails but the steps are wide enough to place RW on step, and the second four steps with bilateral handrails steps remaining wide enough to place RW onto step). Pt reports wife is unable to really assist, but does have a son that is retired and a daughter that works part time. Both can assist as needed. Pt owns RW, cane, wheelchair, commode and shower chair. Independent prior to admission with use of no AD. Pt denies any recent falls prior to this one. Pt drives and is retired.

## 2023-04-25 NOTE — ED CDU PROVIDER DISPOSITION NOTE - PATIENT PORTAL LINK FT
Reviewed.  See other lab message regarding labs You can access the FollowMyHealth Patient Portal offered by [facility] by registering at the following website: [email or web address]. By joining [employer]’s FollowMyHealth portal, you will also be able to view your health information using other applications (apps) compatible with our system.

## 2025-02-09 NOTE — PHYSICAL THERAPY INITIAL EVALUATION ADULT - MANUAL MUSCLE TESTING RESULTS, REHAB EVAL
Bilateral LE grossly >=3/5
Please excuse the patient until the date listed above. -Kaushik Trujillo PA-C

## 2025-06-19 NOTE — ED ADULT NURSE NOTE - CAS EDN INTEG ASSESS
MEDICATIONS  (STANDING):  acetaminophen   IVPB .. 1000 milliGRAM(s) IV Intermittent once  acetaminophen   Oral Liquid .. 1000 milliGRAM(s) Oral every 6 hours  enoxaparin Injectable 40 milliGRAM(s) SubCutaneous every 12 hours  fosaprepitant IVPB 150 milliGRAM(s) IV Intermittent once  ketorolac   Injectable 30 milliGRAM(s) IV Push every 6 hours  lactated ringers. 1000 milliLiter(s) (75 mL/Hr) IV Continuous <Continuous>  pantoprazole  Injectable 40 milliGRAM(s) IV Push daily  sertraline 100 milliGRAM(s) Oral daily    MEDICATIONS  (PRN):  ondansetron Injectable 4 milliGRAM(s) IV Push every 6 hours PRN Nausea and/or Vomiting  oxyCODONE    Solution 5 milliGRAM(s) Oral every 6 hours PRN Severe Pain (7 - 10)  oxyCODONE    Solution 2.5 milliGRAM(s) Oral every 6 hours PRN Moderate Pain (4 - 6)  
- - -